# Patient Record
Sex: MALE | Race: WHITE
[De-identification: names, ages, dates, MRNs, and addresses within clinical notes are randomized per-mention and may not be internally consistent; named-entity substitution may affect disease eponyms.]

---

## 2018-05-28 ENCOUNTER — HOSPITAL ENCOUNTER (INPATIENT)
Dept: HOSPITAL 17 - NEPI | Age: 23
LOS: 3 days | Discharge: HOME HEALTH | DRG: 501 | End: 2018-05-31
Attending: SURGERY | Admitting: SURGERY
Payer: COMMERCIAL

## 2018-05-28 VITALS
HEART RATE: 76 BPM | RESPIRATION RATE: 18 BRPM | TEMPERATURE: 98.6 F | DIASTOLIC BLOOD PRESSURE: 59 MMHG | SYSTOLIC BLOOD PRESSURE: 119 MMHG | OXYGEN SATURATION: 97 %

## 2018-05-28 VITALS
OXYGEN SATURATION: 98 % | DIASTOLIC BLOOD PRESSURE: 66 MMHG | TEMPERATURE: 98.4 F | RESPIRATION RATE: 18 BRPM | HEART RATE: 83 BPM | SYSTOLIC BLOOD PRESSURE: 132 MMHG

## 2018-05-28 VITALS
TEMPERATURE: 98.4 F | HEART RATE: 73 BPM | RESPIRATION RATE: 18 BRPM | OXYGEN SATURATION: 98 % | DIASTOLIC BLOOD PRESSURE: 74 MMHG | SYSTOLIC BLOOD PRESSURE: 151 MMHG

## 2018-05-28 VITALS
HEART RATE: 81 BPM | RESPIRATION RATE: 18 BRPM | DIASTOLIC BLOOD PRESSURE: 66 MMHG | TEMPERATURE: 98.7 F | OXYGEN SATURATION: 94 % | SYSTOLIC BLOOD PRESSURE: 116 MMHG

## 2018-05-28 VITALS
DIASTOLIC BLOOD PRESSURE: 62 MMHG | SYSTOLIC BLOOD PRESSURE: 123 MMHG | RESPIRATION RATE: 20 BRPM | HEART RATE: 77 BPM | OXYGEN SATURATION: 100 %

## 2018-05-28 VITALS
SYSTOLIC BLOOD PRESSURE: 132 MMHG | TEMPERATURE: 98.1 F | OXYGEN SATURATION: 98 % | HEART RATE: 65 BPM | RESPIRATION RATE: 18 BRPM | DIASTOLIC BLOOD PRESSURE: 59 MMHG

## 2018-05-28 VITALS
RESPIRATION RATE: 18 BRPM | DIASTOLIC BLOOD PRESSURE: 61 MMHG | SYSTOLIC BLOOD PRESSURE: 131 MMHG | HEART RATE: 82 BPM | TEMPERATURE: 98.7 F | OXYGEN SATURATION: 97 %

## 2018-05-28 VITALS — BODY MASS INDEX: 25.25 KG/M2 | WEIGHT: 176.37 LBS | HEIGHT: 70 IN

## 2018-05-28 VITALS — OXYGEN SATURATION: 100 %

## 2018-05-28 DIAGNOSIS — S42.001B: ICD-10-CM
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
## 2018-05-28 DIAGNOSIS — S42.101B: Primary | ICD-10-CM

## 2018-05-28 DIAGNOSIS — R40.2413: ICD-10-CM

## 2018-05-28 DIAGNOSIS — S21.111A: ICD-10-CM

## 2018-05-28 LAB
BASOPHILS # BLD AUTO: 0 TH/MM3 (ref 0–0.2)
BASOPHILS NFR BLD: 0.2 % (ref 0–2)
EOSINOPHIL # BLD: 0 TH/MM3 (ref 0–0.4)
EOSINOPHIL NFR BLD: 0.5 % (ref 0–4)
ERYTHROCYTE [DISTWIDTH] IN BLOOD BY AUTOMATED COUNT: 14.2 % (ref 11.6–17.2)
HCT VFR BLD CALC: 40.7 % (ref 39–51)
HGB BLD-MCNC: 14 GM/DL (ref 13–17)
INR PPP: 1 RATIO
LYMPHOCYTES # BLD AUTO: 2.7 TH/MM3 (ref 1–4.8)
LYMPHOCYTES NFR BLD AUTO: 37.1 % (ref 9–44)
MCH RBC QN AUTO: 31.2 PG (ref 27–34)
MCHC RBC AUTO-ENTMCNC: 34.4 % (ref 32–36)
MCV RBC AUTO: 90.6 FL (ref 80–100)
MONOCYTE #: 0.5 TH/MM3 (ref 0–0.9)
MONOCYTES NFR BLD: 6.2 % (ref 0–8)
NEUTROPHILS # BLD AUTO: 4.1 TH/MM3 (ref 1.8–7.7)
NEUTROPHILS NFR BLD AUTO: 56 % (ref 16–70)
PLATELET # BLD: 220 TH/MM3 (ref 150–450)
PMV BLD AUTO: 8.1 FL (ref 7–11)
PROTHROMBIN TIME: 10.4 SEC (ref 9.8–11.6)
RBC # BLD AUTO: 4.49 MIL/MM3 (ref 4.5–5.9)
WBC # BLD AUTO: 7.4 TH/MM3 (ref 4–11)

## 2018-05-28 PROCEDURE — 86850 RBC ANTIBODY SCREEN: CPT

## 2018-05-28 PROCEDURE — 94150 VITAL CAPACITY TEST: CPT

## 2018-05-28 PROCEDURE — 80170 ASSAY OF GENTAMICIN: CPT

## 2018-05-28 PROCEDURE — G0390 TRAUMA RESPONS W/HOSP CRITI: HCPCS

## 2018-05-28 PROCEDURE — 71045 X-RAY EXAM CHEST 1 VIEW: CPT

## 2018-05-28 PROCEDURE — 96376 TX/PRO/DX INJ SAME DRUG ADON: CPT

## 2018-05-28 PROCEDURE — 76000 FLUOROSCOPY <1 HR PHYS/QHP: CPT

## 2018-05-28 PROCEDURE — 80053 COMPREHEN METABOLIC PANEL: CPT

## 2018-05-28 PROCEDURE — 73000 X-RAY EXAM OF COLLAR BONE: CPT

## 2018-05-28 PROCEDURE — 0KB50ZZ EXCISION OF RIGHT SHOULDER MUSCLE, OPEN APPROACH: ICD-10-PCS | Performed by: ORTHOPAEDIC SURGERY

## 2018-05-28 PROCEDURE — 99291 CRITICAL CARE FIRST HOUR: CPT

## 2018-05-28 PROCEDURE — 96374 THER/PROPH/DIAG INJ IV PUSH: CPT

## 2018-05-28 PROCEDURE — 71260 CT THORAX DX C+: CPT

## 2018-05-28 PROCEDURE — 86900 BLOOD TYPING SEROLOGIC ABO: CPT

## 2018-05-28 PROCEDURE — 80048 BASIC METABOLIC PNL TOTAL CA: CPT

## 2018-05-28 PROCEDURE — 96375 TX/PRO/DX INJ NEW DRUG ADDON: CPT

## 2018-05-28 PROCEDURE — 70450 CT HEAD/BRAIN W/O DYE: CPT

## 2018-05-28 PROCEDURE — 72125 CT NECK SPINE W/O DYE: CPT

## 2018-05-28 PROCEDURE — 86901 BLOOD TYPING SEROLOGIC RH(D): CPT

## 2018-05-28 PROCEDURE — 74177 CT ABD & PELVIS W/CONTRAST: CPT

## 2018-05-28 PROCEDURE — 85025 COMPLETE CBC W/AUTO DIFF WBC: CPT

## 2018-05-28 PROCEDURE — 72170 X-RAY EXAM OF PELVIS: CPT

## 2018-05-28 PROCEDURE — 0PB90ZZ EXCISION OF RIGHT CLAVICLE, OPEN APPROACH: ICD-10-PCS | Performed by: ORTHOPAEDIC SURGERY

## 2018-05-28 PROCEDURE — 86920 COMPATIBILITY TEST SPIN: CPT

## 2018-05-28 PROCEDURE — 85610 PROTHROMBIN TIME: CPT

## 2018-05-28 PROCEDURE — 80307 DRUG TEST PRSMV CHEM ANLYZR: CPT

## 2018-05-28 PROCEDURE — 85730 THROMBOPLASTIN TIME PARTIAL: CPT

## 2018-05-28 RX ADMIN — MAGNESIUM HYDROXIDE SCH ML: 400 SUSPENSION ORAL at 09:00

## 2018-05-28 RX ADMIN — Medication SCH ML: at 21:00

## 2018-05-28 RX ADMIN — OXYCODONE HYDROCHLORIDE AND ACETAMINOPHEN PRN TAB: 10; 325 TABLET ORAL at 21:53

## 2018-05-28 RX ADMIN — HYDROMORPHONE HYDROCHLORIDE PRN MG: 2 INJECTION INTRAMUSCULAR; INTRAVENOUS; SUBCUTANEOUS at 06:34

## 2018-05-28 RX ADMIN — HYDROMORPHONE HYDROCHLORIDE PRN MG: 1 INJECTION, SOLUTION INTRAMUSCULAR; INTRAVENOUS; SUBCUTANEOUS at 19:48

## 2018-05-28 RX ADMIN — HYDROMORPHONE HYDROCHLORIDE PRN MG: 2 INJECTION INTRAMUSCULAR; INTRAVENOUS; SUBCUTANEOUS at 14:35

## 2018-05-28 RX ADMIN — DOCUSATE SODIUM SCH MG: 100 CAPSULE, LIQUID FILLED ORAL at 19:48

## 2018-05-28 RX ADMIN — SODIUM CHLORIDE SCH MLS/HR: 900 INJECTION INTRAVENOUS at 18:57

## 2018-05-28 RX ADMIN — HYDROMORPHONE HYDROCHLORIDE PRN MG: 2 INJECTION INTRAMUSCULAR; INTRAVENOUS; SUBCUTANEOUS at 10:06

## 2018-05-28 RX ADMIN — MAGNESIUM HYDROXIDE SCH ML: 400 SUSPENSION ORAL at 19:48

## 2018-05-28 RX ADMIN — SODIUM CHLORIDE SCH MLS/HR: 900 INJECTION INTRAVENOUS at 06:34

## 2018-05-28 RX ADMIN — DOCUSATE SODIUM SCH MG: 100 CAPSULE, LIQUID FILLED ORAL at 09:00
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
## 2018-05-28 NOTE — HHI.PR
cc:   Ofelia Velasquez MD


__________________________________________________





Immediate Post Op Note


Procedure Date:


May 28, 2018


Pre Op Diagnosis:  


Open right clavicle fracture


Open right scapula fracture


Degloving injury to right shoulder


Post Op Diagnosis:  


same


Surgeon:


Ofelia Velasquez


Assistant(s):


none


Procedure:


Irrigation and debridement right open clavicle and scapula fractures


Application of wound vac device, 14cm x 7cm x 5cm


Findings:


Gross contamination with soil and glass


Complications:


none


Specimen(s) removed:


none


Estimated blood loss:


25cc


Anesthesia:  General


Drains:  Other


IVF


Patient to:  PACU


Patient Condition:  Good


Date/Time of Procedure:  SEE SURGICAL CARE RECORD











Ofelia Velasquez MD May 28, 2018 16:38
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
## 2018-05-29 VITALS
OXYGEN SATURATION: 98 % | RESPIRATION RATE: 18 BRPM | HEART RATE: 77 BPM | SYSTOLIC BLOOD PRESSURE: 126 MMHG | DIASTOLIC BLOOD PRESSURE: 58 MMHG | TEMPERATURE: 98.1 F

## 2018-05-29 VITALS
HEART RATE: 66 BPM | TEMPERATURE: 98.4 F | SYSTOLIC BLOOD PRESSURE: 113 MMHG | DIASTOLIC BLOOD PRESSURE: 55 MMHG | RESPIRATION RATE: 16 BRPM | OXYGEN SATURATION: 97 %

## 2018-05-29 VITALS
TEMPERATURE: 98.1 F | DIASTOLIC BLOOD PRESSURE: 57 MMHG | OXYGEN SATURATION: 100 % | RESPIRATION RATE: 18 BRPM | HEART RATE: 73 BPM | SYSTOLIC BLOOD PRESSURE: 117 MMHG

## 2018-05-29 VITALS
TEMPERATURE: 98.4 F | DIASTOLIC BLOOD PRESSURE: 65 MMHG | OXYGEN SATURATION: 95 % | HEART RATE: 78 BPM | RESPIRATION RATE: 16 BRPM | SYSTOLIC BLOOD PRESSURE: 125 MMHG

## 2018-05-29 VITALS
TEMPERATURE: 98.5 F | OXYGEN SATURATION: 97 % | RESPIRATION RATE: 16 BRPM | HEART RATE: 75 BPM | SYSTOLIC BLOOD PRESSURE: 136 MMHG | DIASTOLIC BLOOD PRESSURE: 60 MMHG

## 2018-05-29 VITALS
OXYGEN SATURATION: 98 % | SYSTOLIC BLOOD PRESSURE: 148 MMHG | DIASTOLIC BLOOD PRESSURE: 58 MMHG | RESPIRATION RATE: 18 BRPM | TEMPERATURE: 98.4 F | HEART RATE: 97 BPM

## 2018-05-29 LAB
ALBUMIN SERPL-MCNC: 3.4 GM/DL (ref 3.4–5)
ALP SERPL-CCNC: 74 U/L (ref 45–117)
ALT SERPL-CCNC: 25 U/L (ref 12–78)
AST SERPL-CCNC: 18 U/L (ref 15–37)
BASOPHILS # BLD AUTO: 0 TH/MM3 (ref 0–0.2)
BASOPHILS NFR BLD: 0.1 % (ref 0–2)
BILIRUB SERPL-MCNC: 0.6 MG/DL (ref 0.2–1)
BUN SERPL-MCNC: 11 MG/DL (ref 7–18)
CALCIUM SERPL-MCNC: 8.4 MG/DL (ref 8.5–10.1)
CHLORIDE SERPL-SCNC: 102 MEQ/L (ref 98–107)
CREAT SERPL-MCNC: 0.94 MG/DL (ref 0.6–1.3)
EOSINOPHIL # BLD: 0 TH/MM3 (ref 0–0.4)
EOSINOPHIL NFR BLD: 0 % (ref 0–4)
ERYTHROCYTE [DISTWIDTH] IN BLOOD BY AUTOMATED COUNT: 14.1 % (ref 11.6–17.2)
GFR SERPLBLD BASED ON 1.73 SQ M-ARVRAT: 101 ML/MIN (ref 89–?)
GLUCOSE SERPL-MCNC: 115 MG/DL (ref 74–106)
HCO3 BLD-SCNC: 24.4 MEQ/L (ref 21–32)
HCT VFR BLD CALC: 38.7 % (ref 39–51)
HGB BLD-MCNC: 13.1 GM/DL (ref 13–17)
LYMPHOCYTES # BLD AUTO: 0.8 TH/MM3 (ref 1–4.8)
LYMPHOCYTES NFR BLD AUTO: 5.7 % (ref 9–44)
MCH RBC QN AUTO: 30.7 PG (ref 27–34)
MCHC RBC AUTO-ENTMCNC: 34 % (ref 32–36)
MCV RBC AUTO: 90.3 FL (ref 80–100)
MONOCYTE #: 0.5 TH/MM3 (ref 0–0.9)
MONOCYTES NFR BLD: 3.6 % (ref 0–8)
NEUTROPHILS # BLD AUTO: 12.2 TH/MM3 (ref 1.8–7.7)
NEUTROPHILS NFR BLD AUTO: 90.6 % (ref 16–70)
PLATELET # BLD: 204 TH/MM3 (ref 150–450)
PMV BLD AUTO: 8.7 FL (ref 7–11)
PROT SERPL-MCNC: 7 GM/DL (ref 6.4–8.2)
RBC # BLD AUTO: 4.29 MIL/MM3 (ref 4.5–5.9)
SODIUM SERPL-SCNC: 138 MEQ/L (ref 136–145)
WBC # BLD AUTO: 13.4 TH/MM3 (ref 4–11)

## 2018-05-29 RX ADMIN — MAGNESIUM HYDROXIDE SCH ML: 400 SUSPENSION ORAL at 08:54

## 2018-05-29 RX ADMIN — Medication SCH ML: at 08:55

## 2018-05-29 RX ADMIN — OXYTOCIN SCH MLS/HR: 10 INJECTION, SOLUTION INTRAMUSCULAR; INTRAVENOUS at 10:28

## 2018-05-29 RX ADMIN — OXYCODONE HYDROCHLORIDE AND ACETAMINOPHEN PRN TAB: 10; 325 TABLET ORAL at 16:57

## 2018-05-29 RX ADMIN — OXYCODONE HYDROCHLORIDE AND ACETAMINOPHEN PRN TAB: 10; 325 TABLET ORAL at 08:57

## 2018-05-29 RX ADMIN — OXYTOCIN SCH MLS/HR: 10 INJECTION, SOLUTION INTRAMUSCULAR; INTRAVENOUS at 21:28

## 2018-05-29 RX ADMIN — HYDROMORPHONE HYDROCHLORIDE PRN MG: 1 INJECTION, SOLUTION INTRAMUSCULAR; INTRAVENOUS; SUBCUTANEOUS at 12:59

## 2018-05-29 RX ADMIN — CLINDAMYCIN PHOSPHATE SCH MLS/HR: 150 INJECTION, SOLUTION INTRAMUSCULAR; INTRAVENOUS at 19:34

## 2018-05-29 RX ADMIN — DOCUSATE SODIUM SCH MG: 100 CAPSULE, LIQUID FILLED ORAL at 21:26

## 2018-05-29 RX ADMIN — DOCUSATE SODIUM SCH MG: 100 CAPSULE, LIQUID FILLED ORAL at 08:54

## 2018-05-29 RX ADMIN — Medication SCH ML: at 21:26

## 2018-05-29 RX ADMIN — HYDROMORPHONE HYDROCHLORIDE PRN MG: 1 INJECTION, SOLUTION INTRAMUSCULAR; INTRAVENOUS; SUBCUTANEOUS at 21:27

## 2018-05-29 RX ADMIN — MAGNESIUM HYDROXIDE SCH ML: 400 SUSPENSION ORAL at 21:26

## 2018-05-29 RX ADMIN — OXYCODONE HYDROCHLORIDE AND ACETAMINOPHEN PRN TAB: 10; 325 TABLET ORAL at 05:28

## 2018-05-29 RX ADMIN — CLINDAMYCIN PHOSPHATE SCH MLS/HR: 150 INJECTION, SOLUTION INTRAMUSCULAR; INTRAVENOUS at 08:55

## 2018-05-29 RX ADMIN — CLINDAMYCIN PHOSPHATE SCH MLS/HR: 150 INJECTION, SOLUTION INTRAMUSCULAR; INTRAVENOUS at 00:24

## 2018-05-29 RX ADMIN — SODIUM CHLORIDE SCH MLS/HR: 900 INJECTION INTRAVENOUS at 05:29

## 2018-05-29 RX ADMIN — HYDROMORPHONE HYDROCHLORIDE PRN MG: 1 INJECTION, SOLUTION INTRAMUSCULAR; INTRAVENOUS; SUBCUTANEOUS at 00:09
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
## 2018-05-29 NOTE — PD.ORT.PN
Subjective


Subjective Remarks


POD 1 s/p I&D with vac placement right clavicle and scapula fxs


doing well. pain controlled. resting comfortably





Objective


Vitals





Vital Signs








  Date Time  Temp Pulse Resp B/P (MAP) Pulse Ox O2 Delivery O2 Flow Rate FiO2


 


5/29/18 04:00 98.1 77 18 126/58 (80) 98   


 


5/28/18 23:37 98.7 82 18 131/61 (84) 97   


 


5/28/18 19:47 98.1 65 18 132/59 (83) 98   


 


5/28/18 18:03 98.4 73 18 151/74 (99) 98   


 


5/28/18 17:40 97.5 77 16 136/57 (83) 96 Room Air  


 


5/28/18 17:15 97.5 60 16 125/58 (80) 96 Room Air  


 


5/28/18 17:00 97.5 64 16 128/60 (82) 94 Room Air  


 


5/28/18 16:45 97.5 84 16 146/80 (102) 100 Nasal Cannula 4 


 


5/28/18 15:00 98.4 83 18 132/66 (88) 98   


 


5/28/18 12:00 98.7 81 18 116/66 (83) 94   


 


5/28/18 08:00 98.6 76 18 119/59 (79) 97   














I/O      


 


 5/28/18 5/28/18 5/28/18 5/29/18 5/29/18 5/29/18





 07:00 15:00 23:00 07:00 15:00 23:00


 


Intake Total   1800 ml 360 ml  


 


Output Total   25 ml 625 ml  


 


Balance   1775 ml -265 ml  


 


      


 


Intake Oral   600 ml 360 ml  


 


Other   1200 ml   


 


Output Urine Total    575 ml  


 


Drainage Total    50 ml  


 


Estimated Blood Loss   25 ml   


 


# Bowel Movements    0  








Result Diagram:  


5/29/18 0344                                                                   

             5/29/18 0344





Other Results





Laboratory Tests








Test


  5/28/18


07:36


 


Prothromb Time International


Ratio 1.0 RATIO 


 


 


Prothrombin Time


  10.4 SEC


(9.8-11.6)








Objective Remarks


RUE: +vac. good seal. nvi distally





Assessment & Plan


Assessment and Plan


1) Open right clavicle and Scapula fxs s/p I&D and vac application - POD 1


   -NWB


   -maintain vac


   -npo after MN


   -surgery tomorrow with Scot Samayoa PA/First Assist PA May 29, 2018 07:22

## 2018-05-30 VITALS
TEMPERATURE: 97.6 F | HEART RATE: 63 BPM | RESPIRATION RATE: 16 BRPM | OXYGEN SATURATION: 96 % | DIASTOLIC BLOOD PRESSURE: 58 MMHG | SYSTOLIC BLOOD PRESSURE: 117 MMHG

## 2018-05-30 VITALS
TEMPERATURE: 97.7 F | RESPIRATION RATE: 18 BRPM | DIASTOLIC BLOOD PRESSURE: 59 MMHG | HEART RATE: 69 BPM | OXYGEN SATURATION: 100 % | SYSTOLIC BLOOD PRESSURE: 119 MMHG

## 2018-05-30 VITALS
RESPIRATION RATE: 17 BRPM | SYSTOLIC BLOOD PRESSURE: 138 MMHG | DIASTOLIC BLOOD PRESSURE: 62 MMHG | TEMPERATURE: 98.1 F | OXYGEN SATURATION: 97 % | HEART RATE: 74 BPM

## 2018-05-30 VITALS
DIASTOLIC BLOOD PRESSURE: 134 MMHG | HEART RATE: 63 BPM | OXYGEN SATURATION: 97 % | SYSTOLIC BLOOD PRESSURE: 121 MMHG | TEMPERATURE: 97.4 F | RESPIRATION RATE: 17 BRPM

## 2018-05-30 PROCEDURE — 0PB90ZZ EXCISION OF RIGHT CLAVICLE, OPEN APPROACH: ICD-10-PCS | Performed by: ORTHOPAEDIC SURGERY

## 2018-05-30 PROCEDURE — 0PB50ZZ EXCISION OF RIGHT SCAPULA, OPEN APPROACH: ICD-10-PCS | Performed by: ORTHOPAEDIC SURGERY

## 2018-05-30 PROCEDURE — 0JQ63ZZ REPAIR CHEST SUBCUTANEOUS TISSUE AND FASCIA, PERCUTANEOUS APPROACH: ICD-10-PCS | Performed by: ORTHOPAEDIC SURGERY

## 2018-05-30 RX ADMIN — CLINDAMYCIN PHOSPHATE SCH MLS/HR: 150 INJECTION, SOLUTION INTRAMUSCULAR; INTRAVENOUS at 04:58

## 2018-05-30 RX ADMIN — OXYCODONE HYDROCHLORIDE AND ACETAMINOPHEN PRN TAB: 10; 325 TABLET ORAL at 09:59

## 2018-05-30 RX ADMIN — OXYCODONE HYDROCHLORIDE AND ACETAMINOPHEN PRN TAB: 10; 325 TABLET ORAL at 19:35

## 2018-05-30 RX ADMIN — STANDARDIZED SENNA CONCENTRATE AND DOCUSATE SODIUM SCH TAB: 8.6; 5 TABLET, FILM COATED ORAL at 20:30

## 2018-05-30 RX ADMIN — CEFAZOLIN SODIUM SCH MLS/HR: 2 SOLUTION INTRAVENOUS at 15:21

## 2018-05-30 RX ADMIN — Medication SCH ML: at 09:59

## 2018-05-30 RX ADMIN — MAGNESIUM HYDROXIDE SCH ML: 400 SUSPENSION ORAL at 09:59

## 2018-05-30 RX ADMIN — CALCIUM CARBONATE-CHOLECALCIFEROL TAB 250 MG-125 UNIT SCH MG: 250-125 TAB at 13:06

## 2018-05-30 RX ADMIN — CEFAZOLIN SODIUM SCH MLS/HR: 2 SOLUTION INTRAVENOUS at 22:20

## 2018-05-30 RX ADMIN — OXYCODONE HYDROCHLORIDE AND ACETAMINOPHEN PRN TAB: 10; 325 TABLET ORAL at 04:12

## 2018-05-30 RX ADMIN — CALCIUM CARBONATE-CHOLECALCIFEROL TAB 250 MG-125 UNIT SCH MG: 250-125 TAB at 09:58

## 2018-05-30 RX ADMIN — OXYCODONE HYDROCHLORIDE AND ACETAMINOPHEN PRN TAB: 10; 325 TABLET ORAL at 15:21

## 2018-05-30 RX ADMIN — MAGNESIUM HYDROXIDE SCH ML: 400 SUSPENSION ORAL at 20:30

## 2018-05-30 RX ADMIN — METHOCARBAMOL SCH MG: 500 TABLET ORAL at 22:20

## 2018-05-30 RX ADMIN — DOCUSATE SODIUM SCH MG: 100 CAPSULE, LIQUID FILLED ORAL at 09:59

## 2018-05-30 RX ADMIN — METHOCARBAMOL SCH MG: 500 TABLET ORAL at 16:46

## 2018-05-30 RX ADMIN — MORPHINE SULFATE PRN MG: 2 INJECTION, SOLUTION INTRAMUSCULAR; INTRAVENOUS at 23:43

## 2018-05-30 RX ADMIN — CLINDAMYCIN PHOSPHATE SCH MLS/HR: 150 INJECTION, SOLUTION INTRAMUSCULAR; INTRAVENOUS at 13:06

## 2018-05-30 RX ADMIN — CALCIUM CARBONATE-CHOLECALCIFEROL TAB 250 MG-125 UNIT SCH MG: 250-125 TAB at 16:46

## 2018-05-30 RX ADMIN — Medication SCH ML: at 20:29

## 2018-05-30 NOTE — PD.ORT.PN
Subjective


Subjective Remarks


POD 2 s/p I&D with vac placement right clavicle and scapula fxs


doing well. pain controlled. resting comfortably





Objective


Vitals





Vital Signs








  Date Time  Temp Pulse Resp B/P (MAP) Pulse Ox O2 Delivery O2 Flow Rate FiO2


 


5/30/18 03:39 97.7 69 18 119/59 (79) 100   


 


5/29/18 23:08 98.1 73 18 117/57 (77) 100   


 


5/29/18 19:15 98.4 97 18 148/58 (88) 98   


 


5/29/18 16:00 98.5 75 16 136/60 (85) 97   


 


5/29/18 12:00 98.4 78 16 125/65 (85) 95   


 


5/29/18 08:00 98.4 66 16 113/55 (74) 97   














I/O      


 


 5/29/18 5/29/18 5/29/18 5/30/18 5/30/18 5/30/18





 07:00 15:00 23:00 07:00 15:00 23:00


 


Intake Total 360 ml 202 ml 1562 ml 580 ml  


 


Output Total 625 ml  900 ml   


 


Balance -265 ml 202 ml 662 ml 580 ml  


 


      


 


Intake Oral 360 ml  600 ml 480 ml  


 


IV Total  202 ml 962 ml 100 ml  


 


Output Urine Total 575 ml  775 ml   


 


Drainage Total 50 ml  125 ml   


 


# Voids    3  


 


# Bowel Movements 0   0  








Result Diagram:  


5/29/18 0344                                                                   

             5/29/18 0344





Objective Remarks


RUE: +vac. good seal. nvi distally





Assessment & Plan


Assessment and Plan


1) Open right clavicle and Scapula fxs s/p I&D and vac application - POD 2


   -NWB


   -maintain vac


   -surgery this AM with Scot Samayoa/First Assist PA May 30, 2018 06:29
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
## 2018-05-30 NOTE — RADRPT
EXAM DATE:  5/30/2018 12:23 PM EDT

AGE/SEX:        23 years / Male



INDICATIONS:  I&D right clavicle fracture. 



CLINICAL DATA:  This is the patient's subsequent encounter. Patient reports that signs and symptoms h
ave been present for 3 days and indicates a pain score of Nonresponsive. 

                                                                          

MEDICAL/SURGICAL HISTORY:       . Unobtainable.   . Unobtainable. 



[place]      [facility] [place] [facility] [facility] [place].  .



FINDINGS:  

2 fluoroscopic images of the right shoulder demonstrate interval I&D of the distal right clavicle and
 scapula. Surgical clips are seen in the soft tissues.



CONCLUSION: 

1.  Right distal clavicle and scapula I&D, as above.









Electronically signed by: Jose Poole MD  5/30/2018 1:26 PM EDT

## 2018-05-31 VITALS
HEART RATE: 64 BPM | SYSTOLIC BLOOD PRESSURE: 118 MMHG | TEMPERATURE: 98.2 F | DIASTOLIC BLOOD PRESSURE: 56 MMHG | OXYGEN SATURATION: 98 % | RESPIRATION RATE: 16 BRPM

## 2018-05-31 VITALS
TEMPERATURE: 97.3 F | DIASTOLIC BLOOD PRESSURE: 81 MMHG | RESPIRATION RATE: 16 BRPM | OXYGEN SATURATION: 99 % | SYSTOLIC BLOOD PRESSURE: 132 MMHG | HEART RATE: 58 BPM

## 2018-05-31 VITALS
OXYGEN SATURATION: 97 % | TEMPERATURE: 97.8 F | DIASTOLIC BLOOD PRESSURE: 66 MMHG | HEART RATE: 70 BPM | SYSTOLIC BLOOD PRESSURE: 126 MMHG | RESPIRATION RATE: 18 BRPM

## 2018-05-31 VITALS
RESPIRATION RATE: 17 BRPM | SYSTOLIC BLOOD PRESSURE: 117 MMHG | OXYGEN SATURATION: 96 % | TEMPERATURE: 97.4 F | HEART RATE: 69 BPM | DIASTOLIC BLOOD PRESSURE: 58 MMHG

## 2018-05-31 RX ADMIN — MAGNESIUM HYDROXIDE SCH ML: 400 SUSPENSION ORAL at 08:27

## 2018-05-31 RX ADMIN — CALCIUM CARBONATE-CHOLECALCIFEROL TAB 250 MG-125 UNIT SCH MG: 250-125 TAB at 14:43

## 2018-05-31 RX ADMIN — Medication SCH ML: at 08:28

## 2018-05-31 RX ADMIN — METHOCARBAMOL SCH MG: 500 TABLET ORAL at 14:43

## 2018-05-31 RX ADMIN — OXYCODONE HYDROCHLORIDE AND ACETAMINOPHEN PRN TAB: 10; 325 TABLET ORAL at 04:55

## 2018-05-31 RX ADMIN — OXYCODONE HYDROCHLORIDE AND ACETAMINOPHEN PRN TAB: 10; 325 TABLET ORAL at 11:48

## 2018-05-31 RX ADMIN — MORPHINE SULFATE PRN MG: 2 INJECTION, SOLUTION INTRAMUSCULAR; INTRAVENOUS at 08:27

## 2018-05-31 RX ADMIN — CEFAZOLIN SODIUM SCH MLS/HR: 2 SOLUTION INTRAVENOUS at 06:03

## 2018-05-31 RX ADMIN — OXYCODONE HYDROCHLORIDE AND ACETAMINOPHEN PRN TAB: 10; 325 TABLET ORAL at 16:25

## 2018-05-31 RX ADMIN — CEFAZOLIN SODIUM SCH MLS/HR: 2 SOLUTION INTRAVENOUS at 15:00

## 2018-05-31 RX ADMIN — STANDARDIZED SENNA CONCENTRATE AND DOCUSATE SODIUM SCH TAB: 8.6; 5 TABLET, FILM COATED ORAL at 08:28

## 2018-05-31 RX ADMIN — CALCIUM CARBONATE-CHOLECALCIFEROL TAB 250 MG-125 UNIT SCH MG: 250-125 TAB at 08:28

## 2018-05-31 RX ADMIN — CALCIUM CARBONATE-CHOLECALCIFEROL TAB 250 MG-125 UNIT SCH MG: 250-125 TAB at 18:29

## 2018-05-31 RX ADMIN — METHOCARBAMOL SCH MG: 500 TABLET ORAL at 04:54

## 2018-05-31 NOTE — HHI.FF
Face to Face Verification


Diagnosis:  


(1) Right clavicle fracture


(2) Right scapula fracture


Nursing


Dressing Changes:  Daily dressing change, 4x4s, Xeroform, Coverderm/Primapore











I have seen patient Ervin Salazar on 5/31/18. My clinical findings support the 

need for the requested home health care services because:








 Limited ability to care for self














I certify that my clinical findings support that this patient is homebound 

because:








 Post-op weakness

















Asher Rodriguez Jr. May 31, 2018 06:34

## 2018-05-31 NOTE — PD.ORT.PN
Subjective


Subjective Remarks


Resting comfortably





Objective


Vitals





Vital Signs








  Date Time  Temp Pulse Resp B/P (MAP) Pulse Ox O2 Delivery O2 Flow Rate FiO2


 


5/31/18 04:00 97.4 69 17 117/58 (77) 96   


 


5/31/18 00:57 97.8 70 18 126/66 (86) 97   


 


5/30/18 20:00 98.1 74 17 138/62 (87) 97   


 


5/30/18 16:00 97.4 63 17 121/134 (130) 97   


 


5/30/18 12:00 97.6 63 16 117/58 (77) 96   


 


5/30/18 09:14 98.0 62 12 130/89 (103) 97 Room Air  


 


5/30/18 09:00  60 10 130/89 (103) 96   


 


5/30/18 08:45  62 10 135/90 (105) 96   


 


5/30/18 08:30  74 9 149/95 (113) 96   


 


5/30/18 08:25 98.0 75 10 120/74 (89) 96 Room Air  














I/O      


 


 5/30/18 5/30/18 5/30/18 5/31/18 5/31/18 5/31/18





 07:00 15:00 23:00 07:00 15:00 23:00


 


Intake Total 580 ml 602.5 ml 350 ml 730 ml  


 


Output Total  50 ml 100 ml   


 


Balance 580 ml 552.5 ml 250 ml 730 ml  


 


      


 


Intake Oral 480 ml  300 ml 620 ml  


 


IV Total 100 ml 102.5 ml 50 ml 110 ml  


 


Other  500 ml    


 


Output Urine Total   100 ml   


 


Estimated Blood Loss  50 ml    


 


# Voids 3  2 2  


 


# Bowel Movements 0  0   








Result Diagram:  


5/29/18 0344                                                                   

             5/29/18 0344





Objective Remarks


Right upper extremity: Clean dry dressings intact.  Sling and swath in place.  

Distally intact sensation over the radial ulnar median nerve distributions with 

good capillary refills.  He is able to fully extend his fingers make a fist


Right lower extremity: Abrasions over the knee.  Is able do straight leg test.  

Moderate tenderness to palpation over the patella directly under abrasions.  

Distally intact sensation good capillary refills





Assessment & Plan


Assessment and Plan


1) Open right clavicle and Scapula fxs s/p I&D with traumatic wound closure POD 

1


   -NWB


   -Sling and swath at all times with no motion of shoulder


   -Daily dressing changes to be n.p.o. beginning POD 2


Case management for nursing for dressing changes only.  No range of motion 

remain in sling and swath at all times of right upper extremity


Plan for discharge from orthopedic standpoint with follow-up in 2 weeks with 

Dr. Rodrigez or PA.  We are going to continue to plan on nonoperative treatment 

of right clavicle and scapula fractures.  If the shoulder continues to migrate 

later fixation of the distal clavicle may be necessary.  At this point due to 

the severe trauma, it is unsafe to plate and screws fixate the fracture.  Will 

reassess at the 2 week stacy in office











Asher Rodriguez Jr. May 31, 2018 06:38
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
